# Patient Record
Sex: MALE | Race: WHITE | Employment: OTHER | URBAN - METROPOLITAN AREA
[De-identification: names, ages, dates, MRNs, and addresses within clinical notes are randomized per-mention and may not be internally consistent; named-entity substitution may affect disease eponyms.]

---

## 2017-01-17 PROBLEM — R80.9 MICROALBUMINURIA DUE TO TYPE 2 DIABETES MELLITUS (HCC): Status: ACTIVE | Noted: 2017-01-17

## 2017-01-17 PROBLEM — E11.29 MICROALBUMINURIA DUE TO TYPE 2 DIABETES MELLITUS (HCC): Status: ACTIVE | Noted: 2017-01-17

## 2017-01-17 PROBLEM — F32.A MILD DEPRESSION: Status: ACTIVE | Noted: 2017-01-17

## 2017-04-21 PROBLEM — E11.9 TYPE 2 DIABETES MELLITUS WITH HEMOGLOBIN A1C GOAL OF LESS THAN 8.0% (HCC): Status: ACTIVE | Noted: 2017-04-21

## 2017-08-03 PROBLEM — N99.111 POSTPROCEDURAL BULBOUS URETHRAL STRICTURE: Status: ACTIVE | Noted: 2017-08-03

## 2017-08-03 PROBLEM — Z85.46 HISTORY OF PROSTATE CANCER: Status: ACTIVE | Noted: 2017-08-03

## 2017-08-21 PROCEDURE — 82570 ASSAY OF URINE CREATININE: CPT | Performed by: INTERNAL MEDICINE

## 2017-08-21 PROCEDURE — 82043 UR ALBUMIN QUANTITATIVE: CPT | Performed by: INTERNAL MEDICINE

## 2017-10-17 PROBLEM — G89.29 CHRONIC PAIN OF BOTH KNEES: Status: ACTIVE | Noted: 2017-10-17

## 2017-10-17 PROBLEM — M25.562 CHRONIC PAIN OF BOTH KNEES: Status: ACTIVE | Noted: 2017-10-17

## 2017-10-17 PROBLEM — M25.561 CHRONIC PAIN OF BOTH KNEES: Status: ACTIVE | Noted: 2017-10-17

## 2017-12-13 PROBLEM — R26.2 DIFFICULTY IN WALKING: Status: ACTIVE | Noted: 2017-12-13

## 2017-12-15 PROBLEM — R26.89 BALANCE DISORDER: Status: ACTIVE | Noted: 2017-12-15

## 2017-12-19 PROCEDURE — 87086 URINE CULTURE/COLONY COUNT: CPT | Performed by: PHYSICIAN ASSISTANT

## 2017-12-22 PROCEDURE — 88108 CYTOPATH CONCENTRATE TECH: CPT | Performed by: UROLOGY

## 2018-01-24 PROCEDURE — 82607 VITAMIN B-12: CPT | Performed by: OTHER

## 2018-01-24 PROCEDURE — 86780 TREPONEMA PALLIDUM: CPT | Performed by: OTHER

## 2018-03-19 PROBLEM — E11.59 HYPERTENSION ASSOCIATED WITH DIABETES (HCC): Status: ACTIVE | Noted: 2018-03-19

## 2018-03-19 PROBLEM — I15.2 HYPERTENSION ASSOCIATED WITH DIABETES (HCC): Status: ACTIVE | Noted: 2018-03-19

## 2018-04-05 PROBLEM — Z43.5 ENCOUNTER FOR CARE OR REPLACEMENT OF SUPRAPUBIC TUBE (HCC): Status: ACTIVE | Noted: 2018-04-05

## 2018-04-05 PROBLEM — R31.0 GROSS HEMATURIA: Status: ACTIVE | Noted: 2018-04-05

## 2018-04-05 PROCEDURE — 87086 URINE CULTURE/COLONY COUNT: CPT | Performed by: UROLOGY

## 2018-04-05 PROCEDURE — 87077 CULTURE AEROBIC IDENTIFY: CPT | Performed by: UROLOGY

## 2018-04-05 PROCEDURE — 87186 SC STD MICRODIL/AGAR DIL: CPT | Performed by: UROLOGY

## 2018-04-05 PROCEDURE — 87088 URINE BACTERIA CULTURE: CPT | Performed by: UROLOGY

## 2018-04-17 PROBLEM — G47.33 OBSTRUCTIVE SLEEP APNEA (ADULT) (PEDIATRIC): Status: ACTIVE | Noted: 2018-04-17

## 2019-03-19 PROBLEM — I50.32 CHRONIC DIASTOLIC CONGESTIVE HEART FAILURE (HCC): Status: ACTIVE | Noted: 2019-03-19

## 2019-09-19 PROBLEM — E78.2 MIXED HYPERLIPIDEMIA: Status: ACTIVE | Noted: 2019-09-19

## 2019-09-19 PROBLEM — Z95.5 S/P RIGHT CORONARY ARTERY (RCA) STENT PLACEMENT: Status: ACTIVE | Noted: 2019-09-19

## 2019-09-19 PROBLEM — I10 HTN, GOAL BELOW 130/80: Status: ACTIVE | Noted: 2018-03-19

## 2019-09-19 PROBLEM — I65.23 BILATERAL CAROTID ARTERY STENOSIS: Status: ACTIVE | Noted: 2019-09-19

## 2019-10-08 PROCEDURE — 87086 URINE CULTURE/COLONY COUNT: CPT | Performed by: UROLOGY

## 2019-10-08 PROCEDURE — 87186 SC STD MICRODIL/AGAR DIL: CPT | Performed by: UROLOGY

## 2019-10-08 PROCEDURE — 81015 MICROSCOPIC EXAM OF URINE: CPT | Performed by: UROLOGY

## 2019-10-08 PROCEDURE — 87077 CULTURE AEROBIC IDENTIFY: CPT | Performed by: UROLOGY

## 2020-01-27 ENCOUNTER — APPOINTMENT (OUTPATIENT)
Dept: LAB | Age: 85
End: 2020-01-27
Attending: PHYSICIAN ASSISTANT
Payer: MEDICARE

## 2020-01-27 DIAGNOSIS — D48.5 NEOPLASM OF UNCERTAIN BEHAVIOR OF SKIN: ICD-10-CM

## 2020-01-27 PROCEDURE — 88342 IMHCHEM/IMCYTCHM 1ST ANTB: CPT

## 2020-01-27 PROCEDURE — 88341 IMHCHEM/IMCYTCHM EA ADD ANTB: CPT

## 2020-02-10 PROBLEM — D03.61 MELANOMA IN SITU OF RIGHT UPPER ARM (HCC): Status: ACTIVE | Noted: 2020-02-10

## 2020-03-03 PROCEDURE — 88305 TISSUE EXAM BY PATHOLOGIST: CPT | Performed by: SURGERY

## 2020-05-14 PROBLEM — I65.23 BILATERAL CAROTID ARTERY STENOSIS: Status: RESOLVED | Noted: 2019-09-19 | Resolved: 2020-05-14

## 2020-05-14 PROBLEM — I73.9 SMALL VESSEL DISEASE (HCC): Status: ACTIVE | Noted: 2020-05-14

## 2020-05-14 PROBLEM — G31.9 MILD CEREBRAL ATROPHY (HCC): Status: ACTIVE | Noted: 2020-05-14

## 2020-09-02 PROBLEM — E78.2 MIXED HYPERLIPIDEMIA: Status: RESOLVED | Noted: 2019-09-19 | Resolved: 2020-09-02

## 2021-01-01 ENCOUNTER — HOSPITAL ENCOUNTER (INPATIENT)
Facility: HOSPITAL | Age: 86
LOS: 5 days | Discharge: HOSPICE | DRG: 948 | End: 2021-01-01
Attending: INTERNAL MEDICINE | Admitting: INTERNAL MEDICINE
Payer: OTHER MISCELLANEOUS

## 2021-01-01 ENCOUNTER — LAB REQUISITION (OUTPATIENT)
Dept: LAB | Facility: HOSPITAL | Age: 86
End: 2021-01-01
Payer: MEDICARE

## 2021-01-01 ENCOUNTER — HOSPITAL ENCOUNTER (OUTPATIENT)
Facility: HOSPITAL | Age: 86
Setting detail: OBSERVATION
Discharge: HOSPICE | DRG: 948 | End: 2021-01-01
Attending: EMERGENCY MEDICINE | Admitting: INTERNAL MEDICINE
Payer: OTHER MISCELLANEOUS

## 2021-01-01 VITALS
BODY MASS INDEX: 31.56 KG/M2 | SYSTOLIC BLOOD PRESSURE: 150 MMHG | TEMPERATURE: 97 F | OXYGEN SATURATION: 71 % | RESPIRATION RATE: 20 BRPM | HEART RATE: 62 BPM | DIASTOLIC BLOOD PRESSURE: 56 MMHG | HEIGHT: 70 IN | WEIGHT: 220.44 LBS

## 2021-01-01 VITALS
DIASTOLIC BLOOD PRESSURE: 65 MMHG | OXYGEN SATURATION: 98 % | TEMPERATURE: 99 F | HEART RATE: 70 BPM | RESPIRATION RATE: 18 BRPM | SYSTOLIC BLOOD PRESSURE: 169 MMHG

## 2021-01-01 DIAGNOSIS — I12.9 HYPERTENSIVE CHRONIC KIDNEY DISEASE WITH STAGE 1 THROUGH STAGE 4 CHRONIC KIDNEY DISEASE, OR UNSPECIFIED CHRONIC KIDNEY DISEASE: ICD-10-CM

## 2021-01-01 DIAGNOSIS — Z51.5 HOSPICE CARE PATIENT: Primary | ICD-10-CM

## 2021-01-01 DIAGNOSIS — R45.1 AGITATION: ICD-10-CM

## 2021-01-01 LAB
ANION GAP SERPL CALC-SCNC: 8 MMOL/L (ref 0–18)
BUN BLD-MCNC: 43 MG/DL (ref 7–18)
CALCIUM BLD-MCNC: 8.8 MG/DL (ref 8.5–10.1)
CHLORIDE SERPL-SCNC: 110 MMOL/L (ref 98–112)
CO2 SERPL-SCNC: 20 MMOL/L (ref 21–32)
CREAT BLD-MCNC: 1.48 MG/DL
GLUCOSE BLD-MCNC: 103 MG/DL (ref 70–99)
GLUCOSE BLD-MCNC: 115 MG/DL (ref 70–99)
GLUCOSE BLD-MCNC: 126 MG/DL (ref 70–99)
GLUCOSE BLD-MCNC: 133 MG/DL (ref 70–99)
GLUCOSE BLD-MCNC: 157 MG/DL (ref 70–99)
GLUCOSE BLD-MCNC: 161 MG/DL (ref 70–99)
GLUCOSE BLD-MCNC: 170 MG/DL (ref 70–99)
GLUCOSE BLD-MCNC: 172 MG/DL (ref 70–99)
GLUCOSE BLD-MCNC: 175 MG/DL (ref 70–99)
GLUCOSE BLD-MCNC: 176 MG/DL (ref 70–99)
GLUCOSE BLD-MCNC: 178 MG/DL (ref 70–99)
GLUCOSE BLD-MCNC: 196 MG/DL (ref 70–99)
GLUCOSE BLD-MCNC: 200 MG/DL (ref 70–99)
GLUCOSE BLD-MCNC: 203 MG/DL (ref 70–99)
GLUCOSE BLD-MCNC: 205 MG/DL (ref 70–99)
GLUCOSE BLD-MCNC: 207 MG/DL (ref 70–99)
GLUCOSE BLD-MCNC: 212 MG/DL (ref 70–99)
GLUCOSE BLD-MCNC: 241 MG/DL (ref 70–99)
GLUCOSE BLD-MCNC: 295 MG/DL (ref 70–99)
GLUCOSE BLD-MCNC: 376 MG/DL (ref 70–99)
GLUCOSE BLD-MCNC: 85 MG/DL (ref 70–99)
OSMOLALITY SERPL CALC.SUM OF ELEC: 300 MOSM/KG (ref 275–295)
POTASSIUM SERPL-SCNC: 5.3 MMOL/L (ref 3.5–5.1)
SARS-COV-2 RNA RESP QL NAA+PROBE: NOT DETECTED
SODIUM SERPL-SCNC: 138 MMOL/L (ref 136–145)

## 2021-01-01 PROCEDURE — 82962 GLUCOSE BLOOD TEST: CPT

## 2021-01-01 PROCEDURE — 99285 EMERGENCY DEPT VISIT HI MDM: CPT | Performed by: EMERGENCY MEDICINE

## 2021-01-01 PROCEDURE — 80048 BASIC METABOLIC PNL TOTAL CA: CPT

## 2021-01-01 RX ORDER — CIPROFLOXACIN 500 MG/1
500 TABLET, FILM COATED ORAL DAILY
Status: COMPLETED | OUTPATIENT
Start: 2021-01-01 | End: 2021-01-01

## 2021-01-01 RX ORDER — HALOPERIDOL 2 MG/1
2 TABLET ORAL
Status: DISCONTINUED | OUTPATIENT
Start: 2021-01-01 | End: 2021-01-01

## 2021-01-01 RX ORDER — FUROSEMIDE 10 MG/ML
40 INJECTION INTRAMUSCULAR; INTRAVENOUS EVERY 8 HOURS PRN
Status: DISCONTINUED | OUTPATIENT
Start: 2021-01-01 | End: 2021-01-01

## 2021-01-01 RX ORDER — ONDANSETRON 2 MG/ML
4 INJECTION INTRAMUSCULAR; INTRAVENOUS EVERY 6 HOURS PRN
Status: DISCONTINUED | OUTPATIENT
Start: 2021-01-01 | End: 2021-01-01

## 2021-01-01 RX ORDER — ATROPINE SULFATE 10 MG/ML
2 SOLUTION/ DROPS OPHTHALMIC EVERY 2 HOUR PRN
Refills: 0 | Status: SHIPPED | COMMUNITY
Start: 2021-01-01

## 2021-01-01 RX ORDER — LORAZEPAM 2 MG/1
2 TABLET ORAL EVERY 4 HOURS PRN
Status: DISCONTINUED | OUTPATIENT
Start: 2021-01-01 | End: 2021-01-01

## 2021-01-01 RX ORDER — GLYCOPYRROLATE 0.2 MG/ML
0.4 INJECTION, SOLUTION INTRAMUSCULAR; INTRAVENOUS
Status: DISCONTINUED | OUTPATIENT
Start: 2021-01-01 | End: 2021-01-01

## 2021-01-01 RX ORDER — ATROPINE SULFATE 10 MG/ML
2 SOLUTION/ DROPS OPHTHALMIC EVERY 2 HOUR PRN
Status: DISCONTINUED | OUTPATIENT
Start: 2021-01-01 | End: 2021-01-01

## 2021-01-01 RX ORDER — HALOPERIDOL 1 MG/1
1 TABLET ORAL
Refills: 0 | Status: SHIPPED | COMMUNITY
Start: 2021-01-01

## 2021-01-01 RX ORDER — GLIMEPIRIDE 2 MG/1
4 TABLET ORAL
Status: DISCONTINUED | OUTPATIENT
Start: 2021-01-01 | End: 2021-01-01

## 2021-01-01 RX ORDER — ACETAMINOPHEN 650 MG/1
650 SUPPOSITORY RECTAL EVERY 4 HOURS PRN
Status: DISCONTINUED | OUTPATIENT
Start: 2021-01-01 | End: 2021-01-01

## 2021-01-01 RX ORDER — HALOPERIDOL 5 MG/ML
2 INJECTION INTRAMUSCULAR
Status: DISCONTINUED | OUTPATIENT
Start: 2021-01-01 | End: 2021-01-01

## 2021-01-01 RX ORDER — LORAZEPAM 1 MG/1
1 TABLET ORAL EVERY 4 HOURS PRN
Refills: 0 | Status: SHIPPED | COMMUNITY
Start: 2021-01-01

## 2021-01-01 RX ORDER — MORPHINE SULFATE 2 MG/ML
1 INJECTION, SOLUTION INTRAMUSCULAR; INTRAVENOUS
Status: DISCONTINUED | OUTPATIENT
Start: 2021-01-01 | End: 2021-01-01

## 2021-01-01 RX ORDER — ONDANSETRON 4 MG/1
4 TABLET, ORALLY DISINTEGRATING ORAL EVERY 6 HOURS PRN
Status: DISCONTINUED | OUTPATIENT
Start: 2021-01-01 | End: 2021-01-01

## 2021-01-01 RX ORDER — ACETAMINOPHEN 650 MG/1
650 SUPPOSITORY RECTAL EVERY 4 HOURS PRN
Refills: 0 | Status: SHIPPED | COMMUNITY
Start: 2021-01-01

## 2021-01-01 RX ORDER — HALOPERIDOL 5 MG/ML
1 INJECTION INTRAMUSCULAR
Status: DISCONTINUED | OUTPATIENT
Start: 2021-01-01 | End: 2021-01-01

## 2021-01-01 RX ORDER — METOPROLOL SUCCINATE 100 MG/1
100 TABLET, EXTENDED RELEASE ORAL
Status: DISCONTINUED | OUTPATIENT
Start: 2021-01-01 | End: 2021-01-01

## 2021-01-01 RX ORDER — HALOPERIDOL 5 MG/ML
2 INJECTION INTRAMUSCULAR EVERY 6 HOURS PRN
Refills: 0 | Status: SHIPPED | COMMUNITY
Start: 2021-01-01

## 2021-01-01 RX ORDER — AMLODIPINE BESYLATE 5 MG/1
5 TABLET ORAL DAILY
Status: DISCONTINUED | OUTPATIENT
Start: 2021-01-01 | End: 2021-01-01

## 2021-01-01 RX ORDER — HALOPERIDOL 5 MG/ML
2 INJECTION INTRAMUSCULAR EVERY 6 HOURS PRN
Status: DISCONTINUED | OUTPATIENT
Start: 2021-01-01 | End: 2021-01-01

## 2021-01-01 RX ORDER — ASPIRIN 81 MG/1
81 TABLET, CHEWABLE ORAL DAILY
Status: DISCONTINUED | OUTPATIENT
Start: 2021-01-01 | End: 2021-01-01

## 2021-01-01 RX ORDER — LORAZEPAM 0.5 MG/1
0.5 TABLET ORAL EVERY 4 HOURS PRN
Status: DISCONTINUED | OUTPATIENT
Start: 2021-01-01 | End: 2021-01-01

## 2021-01-01 RX ORDER — HALOPERIDOL 2 MG/1
2 TABLET ORAL
Refills: 0 | Status: SHIPPED | COMMUNITY
Start: 2021-01-01

## 2021-01-01 RX ORDER — LORAZEPAM 1 MG/1
1 TABLET ORAL EVERY 4 HOURS PRN
Status: DISCONTINUED | OUTPATIENT
Start: 2021-01-01 | End: 2021-01-01

## 2021-01-01 RX ORDER — OXYBUTYNIN CHLORIDE 10 MG/1
10 TABLET, EXTENDED RELEASE ORAL DAILY
Status: DISCONTINUED | OUTPATIENT
Start: 2021-01-01 | End: 2021-01-01

## 2021-01-01 RX ORDER — CIPROFLOXACIN 500 MG/1
500 TABLET, FILM COATED ORAL EVERY 12 HOURS SCHEDULED
Status: COMPLETED | OUTPATIENT
Start: 2021-01-01 | End: 2021-01-01

## 2021-01-01 RX ORDER — HALOPERIDOL 1 MG/1
1 TABLET ORAL
Status: DISCONTINUED | OUTPATIENT
Start: 2021-01-01 | End: 2021-01-01

## 2021-01-01 RX ORDER — ALLOPURINOL 300 MG/1
300 TABLET ORAL DAILY
Status: DISCONTINUED | OUTPATIENT
Start: 2021-01-01 | End: 2021-01-01

## 2021-01-01 RX ORDER — BISACODYL 10 MG
10 SUPPOSITORY, RECTAL RECTAL
Refills: 0 | Status: SHIPPED | COMMUNITY
Start: 2021-01-01

## 2021-01-01 RX ORDER — LORAZEPAM 2 MG/ML
1 INJECTION INTRAMUSCULAR EVERY 4 HOURS PRN
Status: DISCONTINUED | OUTPATIENT
Start: 2021-01-01 | End: 2021-01-01

## 2021-01-01 RX ORDER — MORPHINE SULFATE 10 MG/5ML
5 SOLUTION ORAL
Status: DISCONTINUED | OUTPATIENT
Start: 2021-01-01 | End: 2021-01-01

## 2021-01-01 RX ORDER — BISACODYL 10 MG
10 SUPPOSITORY, RECTAL RECTAL
Status: DISCONTINUED | OUTPATIENT
Start: 2021-01-01 | End: 2021-01-01

## 2021-01-01 RX ORDER — FUROSEMIDE 40 MG/1
40 TABLET ORAL DAILY
Status: DISCONTINUED | OUTPATIENT
Start: 2021-01-01 | End: 2021-01-01

## 2021-01-01 RX ORDER — LORAZEPAM 2 MG/1
2 TABLET ORAL EVERY 4 HOURS PRN
Refills: 0 | Status: SHIPPED | COMMUNITY
Start: 2021-01-01

## 2021-01-01 RX ORDER — LORAZEPAM 2 MG/ML
0.5 INJECTION INTRAMUSCULAR EVERY 4 HOURS PRN
Status: DISCONTINUED | OUTPATIENT
Start: 2021-01-01 | End: 2021-01-01

## 2021-01-01 RX ORDER — LORAZEPAM 0.5 MG/1
0.5 TABLET ORAL EVERY 4 HOURS PRN
Refills: 0 | Status: SHIPPED | COMMUNITY
Start: 2021-01-01

## 2021-01-01 RX ORDER — LORAZEPAM 2 MG/ML
2 INJECTION INTRAMUSCULAR EVERY 4 HOURS PRN
Status: DISCONTINUED | OUTPATIENT
Start: 2021-01-01 | End: 2021-01-01

## 2021-03-02 PROBLEM — N18.30 STAGE 3 CHRONIC KIDNEY DISEASE, UNSPECIFIED WHETHER STAGE 3A OR 3B CKD (HCC): Status: ACTIVE | Noted: 2021-01-01

## 2021-03-11 PROBLEM — C67.9 MALIGNANT NEOPLASM OF URINARY BLADDER, UNSPECIFIED SITE (HCC): Status: ACTIVE | Noted: 2021-01-01

## 2021-03-11 PROBLEM — H43.12 VITREOUS HEMORRHAGE, LEFT EYE (HCC): Status: ACTIVE | Noted: 2021-01-01

## 2021-08-02 PROBLEM — N39.0 SEPSIS SECONDARY TO UTI (HCC): Status: ACTIVE | Noted: 2021-01-01

## 2021-08-02 PROBLEM — A41.9 SEPSIS SECONDARY TO UTI (HCC): Status: ACTIVE | Noted: 2021-01-01

## 2021-09-12 PROBLEM — R45.1 AGITATION: Status: ACTIVE | Noted: 2021-01-01

## 2021-09-12 PROBLEM — I67.2 CEREBRAL ATHEROSCLEROSIS: Status: ACTIVE | Noted: 2021-01-01

## 2021-09-12 PROBLEM — Z51.5 HOSPICE CARE PATIENT: Status: ACTIVE | Noted: 2021-01-01

## 2021-09-12 NOTE — H&P
[facility] Hospitalist H&P       CC: Patient presents with:   Other       PCP: Trenton Rose MD    History of Present Illness:  Patient is an 60-year-old male with significant past medical history of hypertension, gout, , CAD status post PCI, A. fib not on anti contusion and trace adjacent subarachnoid hemorrhage in the right frontal lobe similar to prior study  2016   • HTN (hypertension)    • Hyperlipidemia associated with type 2 diabetes mellitus ([facility] [address] 3/27/2013   • Mild cerebral atrophy ([facility] [address] 5/14/2020   • OB 6/22/10    cysto-Dr. Minnie Matt   • OTHER SURGICAL HISTORY  1/2012    stent in right caroid, left iliac   • OTHER SURGICAL HISTORY  2-15-12    cysto-Dr. Alize Thompson   • OTHER SURGICAL HISTORY  10/28/13    Cystoscopy - Dr. Alize Thompson   • OTHER SURGICAL HISTORY  07/07/1 Encounters:  09/12/21 : 220 lb 7.4 oz (100 kg)  09/01/21 : 221 lb (100.2 kg)  08/30/21 : 218 lb (98.9 kg)      Wt Readings from Last 6 Encounters:  09/12/21 : 220 lb 7.4 oz (100 kg)  09/01/21 : 221 lb (100.2 kg)  08/30/21 : 218 lb (98.9 kg)  08/27/21 : 219 Further recommendations pending patient's clinical course.   [facility] hospitalist to continue to follow patient while in house    Time spent: greater than greater than 70 minutes spent in d/w pt/family, coordination of care, and d/w staff.     hebert beatty

## 2021-09-12 NOTE — BH PROGRESS NOTE
Patient was sleeping , grand daughter came and visited patient , he became very awake and got agitated, kicking at staff. Physician contacted and orders received for restraints and haloperidol 2mg IM every 6 hr as needed .  [employer] , security came , tr

## 2021-09-12 NOTE — PROGRESS NOTES
NURSING ADMISSION NOTE      Patient admitted via Cart  Oriented to room. Safety precautions initiated. Bed in low position. Call light in reach. Received patient from ER , alert to self and place . Vitals stable . Admitted under hospice care .  terrence ,

## 2021-09-12 NOTE — ED PROVIDER NOTES
Patient Seen in: [facility] Emergency Department      History   Patient presents with:   Other    Stated Complaint: more delusional hospice patient    Subjective:   HPI    Patient is an 81-year-old gentleman, multiple ongoing medical issues as noted 4-11-18 Split    AHI 86 RDI 89 REM AHI 0 Supine AHI 86 non-supine AHI 0 Sao2 Ketan 81% CPAP 8cwp   • LUCERO (obstructive sleep apnea) PSG 10-15-18 split    AHI 81 RDI 81 REM AHI 0 Supine AHI 81 non-supine AHI 0 Sao2 Ketan 83% CPAP 10cwp   • Osteoarthrosis, un tobacco: Former User        Quit date: 11/23/1971    Vaping Use      Vaping Use: Never used    Alcohol use: Yes      Alcohol/week: 0.0 standard drinks    Drug use:  No             Review of Systems    Positive for stated complaint: more delusional hospice p Impression:  Hospice care patient  (primary encounter diagnosis)  Agitation     Disposition:  Admit  9/12/2021 10:03 am    Follow-up:  No follow-up provider specified.         Medications Prescribed:  Current Discharge Medication List

## 2021-09-12 NOTE — HOSPICE RN NOTE
[facility] nursing visit for home hospice patient with uncontrolled agitation and aggressive behaviors at home. He was seen by home hospice nurse. Given home medications for agitation but were not effective. Wife called 911.  Patient was accepted b

## 2021-09-12 NOTE — PROGRESS NOTES
09/12/21 1348   Clinical Encounter Type   Visited With Patient   Routine Visit Introduction   Continue Visiting No   Patient Spiritual Encounters   Spiritual Coping Strategies Prayer offered as desired   Spiritual care remains available at pager [phone].

## 2021-09-12 NOTE — CM/SW NOTE
Notified by Cori Elliott @ [facility] that patient was coming to the ED. Patient has been agitated and abusive at home. Family is unable to care for patient at home and will likely need placement.   Patient will likely need to be admitted to hospital as

## 2021-09-12 NOTE — HOSPICE RN NOTE
[facility] nursing rounds. Multiple security guards at bedside. Granddaughter Jacobo Silver at bedside as well. Patient got agitated, kicking at staff.  Physician contacted and orders received for restraints and haloperidol 2mg IM every 6 hrs as needed fo

## 2021-09-12 NOTE — ED QUICK NOTES
Bailee Anthony, granddaughter called, [phone]  States to call her if there are any questions or concerns.

## 2021-09-12 NOTE — HOSPICE RN NOTE
[facility] nursing visit for inpatient hospice. Patient has arrived on unit. Patient will be transitioned to inpatient hospice bed.  MSW will be working with family on plans moving forward when ready for discharge.  Patient needs continued freque

## 2021-09-12 NOTE — CM/SW NOTE
Discussed with hospice and RN the possibility of being able to find placement for patient from the ED (at this time, patient is not showing signs of agitation and has not required any medication).   Per hospice, [facility] has no  today and pl

## 2021-09-12 NOTE — ED INITIAL ASSESSMENT (HPI)
Patient on hospice at home. Patient becoming more delusional and aggressive towards wife. Wife does not feel safe at this time and wanted him to come to hospital to find placement to safe area to continue hospice setting.

## 2021-09-13 NOTE — PLAN OF CARE
A&Ox2-4. VSS. 4L Ow--. Patient lethargic, not agitated or restless at this time. Communicated appropriately with staff when receiving accucheck and Cipro tablet. GI: Abdomen soft, distended. Denies nausea.   : Suprapubic catheter intact draining yell for the need to continue restraints  Outcome: Progressing     Problem: Patient/Family Goals  Goal: Patient/Family Long Term Goal  Description: Patient's Long Term Goal: Discharge    Interventions:  -Case management placement  - See additional Care Plan goa

## 2021-09-13 NOTE — CM/SW NOTE
SW met with pt in room, confused. Pt wanting to know the plan but forgetful about why he was in the hospital. Wife arrived and spoke with her about meeting with her and son tomorrow .  Referrals sent to facilities near [place] since wife no longer able to c

## 2021-09-13 NOTE — H&P
[facility] Hospitalist H&P       CC: No chief complaint on file.        PCP: Vicky Ames MD    History of Present Illness:  Patient is an 81-year-old male with significant past medical history of hypertension, gout, , CAD status post PCI, A. fib not on antico contusion and trace adjacent subarachnoid hemorrhage in the right frontal lobe similar to prior study  2016   • HTN (hypertension)    • Hyperlipidemia associated with type 2 diabetes mellitus ([facility] [address] 3/27/2013   • Mild cerebral atrophy ([facility] [address] 5/14/2020   • OB 6/22/10    cysto-Dr. Brandyn Kovacs   • OTHER SURGICAL HISTORY  1/2012    stent in right caroid, left iliac   • OTHER SURGICAL HISTORY  2-15-12    cysto-Dr. Rosa Cowan   • OTHER SURGICAL HISTORY  10/28/13    Cystoscopy - Dr. Rosa Cowan   • OTHER SURGICAL HISTORY  07/07/1 from Last 6 Encounters:  09/12/21 : 220 lb 7.4 oz (100 kg)  09/01/21 : 221 lb (100.2 kg)  08/30/21 : 218 lb (98.9 kg)  08/27/21 : 219 lb (99.3 kg)  08/02/21 : 221 lb (100.2 kg)  03/02/21 : 224 lb (101.6 kg)    Gen: No acute distress, alert and oriented x3 history and medications. Further recommendations pending patient's clinical course.   [facility] hospitalist to continue to follow patient while in house    Time spent: greater than greater than 70 minutes spent in d/w pt/family, coordination of care, and d/w

## 2021-09-13 NOTE — PLAN OF CARE
Patient is alert and calm this morning. Patient is cooperative with care. Patient can be forgetful and confused at times. Restraints were removed at 1030 this morning. Patient does have a cough. Lung sounds were diminished on 3L of oxygen.  Patient's wife s and elimination needs   - Reorient and redirection as needed  - Assess for the need to continue restraints  Outcome: Progressing     Problem: Patient/Family Goals  Goal: Patient/Family Long Term Goal  Description: Patient's Long Term Goal: Discharge    Int

## 2021-09-13 NOTE — HOSPICE RN NOTE
Pt awake, shows some confusion. RR 16, irregular with 10-15 sec apnea, clear, not labored. He denies pain and does not appear to be in pain.   RN Sherry Cramer reports that he has not been agitated this AM and restraints were removed this AM.  He had Haldol and

## 2021-09-13 NOTE — PROGRESS NOTES
[facility] Hospitalist Progress Note                                                                   [address]  8/29/1935    SUBJECTIVE: Patient calm at this time.  Still in a vest restrain **OR** LORazepam **OR** LORazepam, LORazepam **OR** LORazepam **OR** LORazepam, furosemide, glycopyrrolate, Atropine Sulfate, bisacodyl, ondansetron **OR** ondansetron, haloperidol lactate    ASSESSMENT / PLAN:      Patient is an 21-year-old male with sign

## 2021-09-13 NOTE — HOSPICE RN NOTE
[facility] nursing rounds. No family at bedside at this time. Discussed patient status with hospital nurse Hola Mcneal. Patient remains in restraints.  He has received Haloperidol 2mg IM and 1 dose Lorazepam 2mg IVP since admission to inpatient hos

## 2021-09-14 NOTE — PLAN OF CARE
Patient A&Ox4, 3L O2 , VSS. Patient lethargic at times, but able to communicate clearly with RN. Redness noted to lower legs. Denies pain. Tolerating diet. IV saline locked. Suprapubic catheter intact and draining clear yellow urine. Bed alarm in place.  A Discharge    Interventions:  -Case management placement  - See additional Care Plan goals for specific interventions  Outcome: Progressing  Goal: Patient/Family Short Term Goal  Description: Patient's Short Term Goal: Comfort    Interventions:   -PRN pain

## 2021-09-14 NOTE — PLAN OF CARE
Pt A & O x4, forgetful. Agreed to bed bath in am. DRSG applied to buttocks; per wife DTI first identified in July. MD informed. Explained plan of care to patient, unable to retain information. Suprapubic cath to gravity.      I agree with the charting of Thierry Peace

## 2021-09-14 NOTE — HOSPICE RN NOTE
Pt dozing, awakened to look up and returned to dozing. RR 18, clear, unlabored. No s/s of pain. Restraints off nearly 24 hour and was without agitation overnight. Placement being sought for d/c. POC reviewed with RNs Cesar Hua and Shreyas Pimentel.

## 2021-09-14 NOTE — CM/SW NOTE
SW met with pts wife, brother , step son, friend and social work intern . Met to discuss hospice status. Wife stated that she would like pt placed into facility. Discussed options of placement, discussed family dynamics.  Discussed facilities that wife wo

## 2021-09-14 NOTE — PLAN OF CARE
Pt was cooperative and able to be off restrains during shift.      PT axox4, resting in bed, pt denies any pain, lungs clear, abdomen distended, firm to touch, pt passing gas but not belching, suprapubic cath draining clear yellow urine, lower legs are red, Goal  Description: Patient's Long Term Goal: Discharge    Interventions:  -Case management placement  - See additional Care Plan goals for specific interventions  Outcome: Progressing  Goal: Patient/Family Short Term Goal  Description: Patient's Short Term

## 2021-09-15 NOTE — PLAN OF CARE
A & O x 2, disoriented to situation. 02 2L. min NC, diminished breath sounds. Tolerating diet. Denies nausea. Suprapubic cath intact; yellow urine.      Problem: Risk for Violence-Violent Restraints/Seclusion  Goal: Patient will not express any violent or se pain medication, ativan, haldol  - See additional Care Plan goals for specific interventions  Outcome: Progressing

## 2021-09-15 NOTE — PLAN OF CARE
PT axox2, pt at around midnight, started to become agitated and wanted to get up and go outside to the lawn and talk to his wife, or he was going to break the bed.   Pt was also threatening to take us all to court for not doing something about his lower leg the need to continue restraints  Outcome: Progressing     Problem: Patient/Family Goals  Goal: Patient/Family Long Term Goal  Description: Patient's Long Term Goal: Discharge    Interventions:  -Case management placement  - See additional Care Plan goals f

## 2021-09-15 NOTE — PROGRESS NOTES
[facility] Hospitalist Progress Note                                                                   [address] [facility]  8/29/1935    SUBJECTIVE: Patient calm at this time.  Patient was agitated ove haloperidol, haloperidol lactate **OR** haloperidol lactate, LORazepam **OR** LORazepam **OR** LORazepam, LORazepam **OR** LORazepam **OR** LORazepam, furosemide, glycopyrrolate, Atropine Sulfate, bisacodyl, ondansetron **OR** ondansetron, haloperidol lact

## 2021-09-15 NOTE — HOSPICE RN NOTE
Pt khai. Family visiting. RR 18, unlabored, no s/s of pain. Discharge planning reviewed. He has been accepted at [facility] in [place] and this info was shared with wife Deidra Gustafson for her to follow up.   He remains restraint free and had one 1mg IVP dos

## 2021-09-15 NOTE — PROGRESS NOTES
[facility] Hospitalist Progress Note                                                                   [address]  8/29/1935    SUBJECTIVE: Patient calm at this time.  Has not needed restraint haloperidol lactate **OR** haloperidol lactate, LORazepam **OR** LORazepam **OR** LORazepam, LORazepam **OR** LORazepam **OR** LORazepam, furosemide, glycopyrrolate, Atropine Sulfate, bisacodyl, ondansetron **OR** ondansetron, haloperidol lactate    ASSESS

## 2021-09-16 NOTE — PLAN OF CARE
A & O x 2, forgetful. 2L/min NC. Tolerating diet. Suprapubic cath draining; clear yellow urine. Called for assistance.      Problem: Risk for Violence-Violent Restraints/Seclusion  Goal: Patient will not express any violent or self-destructive behaviors  [employer] haldol  - See additional Care Plan goals for specific interventions  Outcome: Progressing

## 2021-09-16 NOTE — PLAN OF CARE
Pt a&ox2, forgetful and impulsive at times. 2-3L NC. Tolerating diet. Denies pain. IV SL. Suprapubic catheter with clear yellow urine draining. Dressing to buttocks c/d/i. Safety precautions in place.      Problem: Risk for Violence-Violent Restraints/Seclu Patient's Short Term Goal: Comfort    Interventions:   -PRN pain medication, ativan, haldol  - See additional Care Plan goals for specific interventions  Outcome: Progressing

## 2021-09-16 NOTE — PROGRESS NOTES
[facility] Hospitalist Progress Note                                                                   [address]  8/29/1935    SUBJECTIVE: Patient calm at this time.  Patient not agitated ove LORazepam **OR** LORazepam **OR** LORazepam, LORazepam **OR** LORazepam **OR** LORazepam, furosemide, glycopyrrolate, Atropine Sulfate, bisacodyl, ondansetron **OR** ondansetron, haloperidol lactate    ASSESSMENT / PLAN:      Patient is an 52-year-old male

## 2021-09-16 NOTE — CM/SW NOTE
[facility] have accepted pt, awaiting final documentation to transfer pt to routine loc once [place] receives paperwork .

## 2021-09-16 NOTE — HOSPICE RN NOTE
Phyliss Phlegm is awake, interacting this AM and pleasant, no agitation. Denies pain. RR 18, unlabored, clear. He has been eating 100% of 3 meals over the last few days. Had a BM yesterday. He did not have a need for ativan or other meds overnight.   Discharge pl

## 2021-09-17 NOTE — PLAN OF CARE
Upon assessment pt is a&o x3-4, forgetful at times. VSS and afebrile. Pt denies calf pain, chest pain and ERNESTO. 2L O2 NC, . Pt tolerating regular diet, denies n/v. QID AC. Suprapubic catheter in place draining clear yellow urine. Up with SBA and walker. restraints  Outcome: Progressing     Problem: Patient/Family Goals  Goal: Patient/Family Long Term Goal  Description: Patient's Long Term Goal: Discharge    Interventions:  -Case management placement  - See additional Care Plan goals for specific intervent

## 2021-09-17 NOTE — PLAN OF CARE
Patient is alert and oriented. Patient can be confused/ forgetful at times. Patient is cooperative with care this morning. Patient has calm behavior. On 2L of O2 via nasal cannula. Abdomen is soft/ non-distended.  Suprapubic cath with crust/ brown drainage Long Term Goal  Description: Patient's Long Term Goal: Discharge    Interventions:  -Case management placement  - See additional Care Plan goals for specific interventions  Outcome: Progressing  Goal: Patient/Family Short Term Goal  Description: Patient's

## 2021-09-17 NOTE — CM/SW NOTE
JOSEMANUEL called spouse at 10 am to see if I could be of any assistance. She stated that she was looking at [facility] for reviews of 2 facilities and favored one of them. She was waiting for a return call from that facility.  I again called the spouse at 1 pm and she

## 2021-09-17 NOTE — HOSPICE RN NOTE
Pt dozing intermittently; awakens when spoken to. RR 18, clear, unlabored, no s/s of pain. He has not had agitation overnight and the last prn ativan 1mg at 1315 yesterday afternoon, and this was the only dose in last 24h.   Aidin shows 2 local facilities

## 2021-09-17 NOTE — PROGRESS NOTES
NURSING DISCHARGE NOTE    Discharged to [facility] via Ambulance. Accompanied by Support staff  Belongings Taken by patient/family. Ambulance came to take patient around [phone]. IV taken out. Report given Jamaal Clarker at [facility].  Report given to ANABELLE Barraza ab

## 2021-09-23 NOTE — DISCHARGE SUMMARY
[address] Patient Status:  Inpatient    1935 MRN [id number]   [facility] 3NW-A Attending No att. providers found   [address] PCP Mauricio Wolf MD     Date of Admission: 2021  Date Inject 0.4 mL (2 mg total) into the muscle every 6 (six) hours as needed. Refills: 0     LORazepam 0.5 MG Tabs  Commonly known as: ATIVAN      Take 1 tablet (0.5 mg total) by mouth every 4 (four) hours as needed.    Refills: 0     LORazepam 1 MG Tabs after that   Quantity: 60 tablet  Refills: 3     metFORMIN 500 MG Tabs  Commonly known as: GLUCOPHAGE      Take 1 tablet (500 mg total) by mouth daily with breakfast.   Quantity: 90 tablet  Refills: 0     metoprolol succinate 100 MG Tb24  Commonly known as care and as needed medications per hospice for agitation--> no significant agitation since admission   -inpatient hospice--> pending transfer to facility as family unable to care for pt at home  -haldol PRN   -No agitation currently     #Hypertension  -bp

## 2022-02-28 NOTE — CM/SW NOTE
SW received call from [facility] stating that they are no longer able to accept patient. SW working with facilities about additional facilities placement. Parent

## (undated) NOTE — IP AVS SNAPSHOT
Patient Demographics     Address  [address]   [address] [facility] [address] Phone  [phone] [facility])  [phone] (Mobile) *Preferred* E-mail Address  [email or web address]      Emergency Contact(s)     Name Relation Home Work Mobile    Lizzy Lo Spouse [phone] LOTENSIN      TAKE 1 TABLET BY MOUTH EVERY DAY   Kasie Robertson MD         bisacodyl 10 MG Supp  Commonly known as: DULCOLAX  Next dose due: Last dose given on 9/15      Place 1 suppository (10 mg total) rectally daily as needed.    Mag Jones MD succinate 100 MG Tb24  Commonly known as: Toprol XL  Next dose due: Tomorrow morning      Take 1 tablet (100 mg total) by mouth daily. Julianne Muñoz MD         oxybutynin 10 MG Tb24  Commonly known as: DITROPAN-XL  Next dose due:  Tomorrow morning      T Results (All)     None         H&P - H&P Note      H&P signed by Aundria Libman, MD at 9/12/2021  8:30 PM  Version 1 of 1    Author: Aundria Libman, MD Service: Hospitalist Author Type: Physician    Filed: 9/12/2021  8:30 PM Date of Service: 9/12/2021 left eye 2012   • Cystitis      mild radiation cystitis   • Depression 11/7/2013   • Diabetes [facility])    • Essential hypertension    • Essential hypertension, benign 12/29/2010   • Generalized osteoarthrosis, involving multiple sites 3/22/2011   • Gout of an Screening: 3 small polyps (1 adenoma, 2 hyperplastic), moderate diverticulosis; no further routine colon cancer screening   • HIP REPLACEMENT SURGERY     • OTHER  STENT LEFT CAROTID ARTERY   • OTHER SURGICAL HISTORY  1-6-09    prostate bx, Dr. [facility]   • bruising     OBJECTIVE:  /57   Pulse 67   Temp 98.4 °F (36.9 °C)   Resp 20   SpO2 91%     BP Readings from Last 3 Encounters:  09/12/21 : 158/57  09/12/21 : 150/56  09/01/21 : 126/72    Wt Readings from Last 3 Encounters:  09/12/21 : 220 lb 7.4 oz (1 subdural hematoma  #$Diastolic heart failure\#CKD 3\#type 2 diabetes\-all medications per hospice care no further labs or work-up needed at this time    Patient is DNR /selec treatment       Dispo: admit to inpatient hospice    POA is wife/slava-info to on 09/21/15     INFLUENZA 09/24/14     INFLUENZA 10/31/13     INFLUENZA 10/31/12     INFLUENZA 09/14/11     INFLUENZA 09/15/09     INFLUENZA 11/08/08     Pneumococcal (Prevnar 13) 05/06/15     Pneumovax 23 07/14/11     TDAP 03/04/16     Technetium Tc99mm Sest

## (undated) NOTE — IP AVS SNAPSHOT
[address]            (For Outpatient Use Only) Initial Admit Date: 9/12/2021   Inpt/Obs Admit Date: Inpt: 9/12/21 / Obs: N/A   Discharge Date:    Linda Best:  [de-identified]   MRN: [de-identified]   CSN: [id number]   CEID: [id number] to Subscriber:    Hospital Account Financial Class:  Other    September 17, 2021